# Patient Record
Sex: FEMALE | Race: WHITE | Employment: FULL TIME | URBAN - METROPOLITAN AREA
[De-identification: names, ages, dates, MRNs, and addresses within clinical notes are randomized per-mention and may not be internally consistent; named-entity substitution may affect disease eponyms.]

---

## 2022-11-29 ENCOUNTER — OFFICE VISIT (OUTPATIENT)
Dept: ORTHOPEDIC SURGERY | Age: 52
End: 2022-11-29
Payer: COMMERCIAL

## 2022-11-29 VITALS — WEIGHT: 195 LBS | BODY MASS INDEX: 31.34 KG/M2 | HEIGHT: 66 IN

## 2022-11-29 DIAGNOSIS — M79.672 LEFT FOOT PAIN: ICD-10-CM

## 2022-11-29 DIAGNOSIS — S92.512A DISPLACED FRACTURE OF PROXIMAL PHALANX OF LEFT LESSER TOE(S), INITIAL ENCOUNTER FOR CLOSED FRACTURE: Primary | ICD-10-CM

## 2022-11-29 RX ORDER — LEVOTHYROXINE SODIUM 100 UG/1
TABLET ORAL
COMMUNITY

## 2022-11-29 NOTE — LETTER
12/2/2022    Patient: Chad Greenberg   YOB: 1970   Date of Visit: 11/29/2022     Franchesca Negrete MD  [address] [place] [phone]  Via Fax: [phone]    Dear Franchesca Negrete MD,      Thank you for referring Ms. Chad Greenberg to [facility] for evaluation. My notes for this consultation are attached. If you have questions, please do not hesitate to call me. I look forward to following your patient along with you.       Sincerely,    Althea Jorge MD

## 2022-11-29 NOTE — PROGRESS NOTES
Mireya Gardner (: 1970) is a 45 y.o. female, patient,here for evaluation of the following   Chief Complaint   Patient presents with    Foot Pain        ASSESSMENT/PLAN:  Below is the assessment and plan developed based on review of pertinent history, physical exam, labs, studies, and medications. 1. Displaced fracture of proximal phalanx of left lesser toe(s), initial encounter for closed fracture  -     [place] CLOSED TX TOE FX  2. Left foot pain  -     XR STANDING FOOT LT MIN 3 V; Future      The patient has a left fifth toe proximal phalanx fracture otherwise well aligned. This fracture is at the base involving the condyle and its into the joint but overall joint alignment is good and minimally displaced. I do recommend a rigid soled shoe and angel taping to keep the toe immobilized so does not move so much as it is at the joint and the motion can create delayed union. She understands this and will wear more rigid soled shoes and angel tape as instructed. I did inform her to return in approximately 4 to 6 weeks at which time we will get new x-rays of the left fifth toe 3 views. Return in about 6 weeks (around 1/10/2023) for repeat xrays. Allergies   Allergen Reactions    Bactrim [Sulfamethoprim] Other (comments)       Current Outpatient Medications   Medication Sig    levothyroxine (SYNTHROID) 100 mcg tablet Take  by mouth Daily (before breakfast). No current facility-administered medications for this visit. History reviewed. No pertinent past medical history. History reviewed. No pertinent surgical history. History reviewed. No pertinent family history.     Social History     Socioeconomic History    Marital status:      Spouse name: Not on file    Number of children: Not on file    Years of education: Not on file    Highest education level: Not on file   Occupational History    Not on file   Tobacco Use    Smoking status: Never    Smokeless tobacco: Never   Vaping Use Vaping Use: Never used   Substance and Sexual Activity    Alcohol use: Yes    Drug use: Never    Sexual activity: Not on file   Other Topics Concern    Not on file   Social History Narrative    Not on file     Social Determinants of Health     Financial Resource Strain: Not on file   Food Insecurity: Not on file   Transportation Needs: Not on file   Physical Activity: Not on file   Stress: Not on file   Social Connections: Not on file   Intimate Partner Violence: Not on file   Housing Stability: Not on file           Vitals:  Ht 5' 6\" (1.676 m)   Wt 195 lb (88.5 kg)   BMI 31.47 kg/m²    Body mass index is 31.47 kg/m². ROS    Positive for: Musculoskeletal  Last edited by Trina Baird on 2022 10:11 AM.              SUBJECTIVE:  Florencia Miller (: 1970)   New patient presents today with complaint of left foot pain about 3 weeks ago now sudden onset related to an accident occurred on 2020 2 in the evening. She was packing to go on a trip and somehow injured her left fifth toe. She had immediate pain and swelling. Current pain is mild and it still comes and goes. She has tried rest, ice and elevation. She was evaluated at other facility on 2022 due to persistent pain and referred to orthopedics. She is not diabetic, non-smoker. OBJECTIVE EXAM:     Visit Vitals  Ht 5' 6\" (1.676 m)   Wt 195 lb (88.5 kg)   BMI 31.47 kg/m²       Appearance: Alert, well appearing and pleasant patient who is in no distress, oriented to person, place/time, and who follows commands. This patient is accompanied in the       office by her  self. Psychiatric: Affect and mood are appropriate. No dementia noted on examination  Musculoskeletal:  LOCATION: Diffuse tenderness and swelling fifth toe foot - left      Integumentary: No rashes, skin patches, wounds, or abrasions to the right or left legs       Warm and normal color. No regions of expressible drainage.       Gait: Normal Tenderness: No tenderness        Motor/Strength/Tone Exam: Normal       Sensory Exam:   Intact Normal Sensation to ankle/foot      Stability Testing: No anterolateral or varus instability of the Ankle or Subtalar Joints               No peroneal tendon instability noted      ROM: Normal ROM noted to ankle/foot      Contractures: No Achilles or Gastrocnemius Contractures      Calf tenderness: Absent for calf or gastrocnemius muscle regions       Soft, supple, non tender, non taut lower extremity compartment  Alignment:      NEUTRAL Hindfoot,         none Metatarsus Adductus Metatarsus   Wounds/Abrasions:    None present  Extremities:   No embolic phenomena to the toes          No significant edema to the foot and or toes. Lower extremities are warm and appear well perfused    DVT: No evidence of DVT seen on examination at this time     No calf swelling, no tenderness to calf muscles  Lymphatic:  No Evidence of Lymphedema  Vascular: Medial Border of Tibia Region: Edema is not present         Pulses: Dorsalis Pedis &  Posterior Tibial Pulses : Palpable yes        Varicosities Lower Limbs :  None  noted  Neuro: Negative bilateral Straight leg raise (seated position)    See Musculoskeletal section for pertinent individual extremity examination    No abnormal hand/wrist, foot/ankle, or facial/neck tremors. Lower Extremity/Ankle/Foot:  Mild antalgic gait, satisfactory weightbearing stance. Left lower extremity/ankle: Full active and passive range of motion intact, nontender, swelling, ligament stable. Normal exam.    Left foot: Fifth toe is not malaligned or deformed, there is swelling and tenderness to palpation proximal phalanx, rest of toes nontender and the fifth metatarsal is nontender. Range of motion of toes intact except for the fifth toe and limitations related to pain. Contralateral lower extremity/ankle /foot exam:  Nontender, no swelling ligaments grossly stable.   Normal weightbearing stance. Neurovascular exam grossly intact light touch sensation, cap refill brisk, dorsalis pedis pulse palpable, FHL/EHL 5/5. Imaging:    XR Results (most recent):  Results from Appointment encounter on 11/29/22    XR STANDING FOOT LT MIN 3 V    Narrative  Left Foot AP, lateral and oblique standing x-rays show left fifth toe proximal phalanx fracture, minimally displaced near the medial condyle proximal phalanx extends into the joint, otherwise joint is in satisfactory alignment and congruent. No fractures or dislocations seen. No significant signs of healing yet. An electronic signature was used to authenticate this note.   -- Dontae Carter MD

## 2023-01-03 ENCOUNTER — OFFICE VISIT (OUTPATIENT)
Dept: ORTHOPEDIC SURGERY | Age: 53
End: 2023-01-03
Payer: COMMERCIAL

## 2023-01-03 VITALS — HEIGHT: 66 IN | BODY MASS INDEX: 31.34 KG/M2 | WEIGHT: 195 LBS

## 2023-01-03 DIAGNOSIS — M79.672 LEFT FOOT PAIN: ICD-10-CM

## 2023-01-03 DIAGNOSIS — S92.512D CLOSED DISPLACED FRACTURE OF PROXIMAL PHALANX OF LESSER TOE OF LEFT FOOT WITH ROUTINE HEALING, SUBSEQUENT ENCOUNTER: Primary | ICD-10-CM

## 2023-01-03 PROCEDURE — 99212 OFFICE O/P EST SF 10 MIN: CPT | Performed by: ORTHOPAEDIC SURGERY

## 2023-01-03 NOTE — PROGRESS NOTES
Ezra Gutierrez (: 1970) is a 45 y.o. female, patient,here for evaluation of the following   Chief Complaint   Patient presents with    Foot Pain    Follow-up        ASSESSMENT/PLAN:  Below is the assessment and plan developed based on review of pertinent history, physical exam, labs, studies, and medications. 1. Closed displaced fracture of proximal phalanx of lesser toe of left foot with routine healing, subsequent encounter  2. Left foot pain  -     XR 5TH TOE LT MIN 2 V; Future      Patient is informed of findings on exam and x-rays reviewed today. Overall she is doing much better, the fracture is mostly healed although not completely healed, the overall alignment remains good. She is to continue in a postop shoe for another few days and then transition to more rigid soled shoes to wear for walking. She will schedule an appointment for approximately 6 weeks at which time we will get new x-rays of the left fifth toe 3 views. Return if symptoms worsen or fail to improve. Allergies   Allergen Reactions    Bactrim [Sulfamethoprim] Other (comments)       Current Outpatient Medications   Medication Sig    levothyroxine (SYNTHROID) 100 mcg tablet Take  by mouth Daily (before breakfast). No current facility-administered medications for this visit. No past medical history on file. No past surgical history on file. No family history on file.     Social History     Socioeconomic History    Marital status:      Spouse name: Not on file    Number of children: Not on file    Years of education: Not on file    Highest education level: Not on file   Occupational History    Not on file   Tobacco Use    Smoking status: Never    Smokeless tobacco: Never   Vaping Use    Vaping Use: Never used   Substance and Sexual Activity    Alcohol use: Yes    Drug use: Never    Sexual activity: Not on file   Other Topics Concern    Not on file   Social History Narrative    Not on file     Social Determinants of Health     Financial Resource Strain: Not on file   Food Insecurity: Not on file   Transportation Needs: Not on file   Physical Activity: Not on file   Stress: Not on file   Social Connections: Not on file   Intimate Partner Violence: Not on file   Housing Stability: Not on file           Vitals:  Ht 5' 6\" (1.676 m)   Wt 195 lb (88.5 kg)   BMI 31.47 kg/m²    Body mass index is 31.47 kg/m². SUBJECTIVE:  Rambo Wolf (: 1970)   Patient returns today for follow-up of left foot fifth toe proximal phalanx fracture towards the base, injury from 2022. Overall she is doing okay she has no complaints today. She has mild discomfort with increased activities is the most symptoms she is currently having. OBJECTIVE EXAM:     Visit Vitals  Ht 5' 6\" (1.676 m)   Wt 195 lb (88.5 kg)   BMI 31.47 kg/m²       Appearance: Alert, well appearing and pleasant patient who is in no distress, oriented to person, place/time, and who follows commands. This patient is accompanied in the       office by her  self. Psychiatric: Affect and mood are appropriate. No dementia noted on examination  Musculoskeletal:  LOCATION: Minimal tenderness or swelling fifth toe foot - left      Integumentary: No rashes, skin patches, wounds, or abrasions to the right or left legs       Warm and normal color. No regions of expressible drainage.       Gait: Normal      Tenderness: No tenderness        Motor/Strength/Tone Exam: Normal       Sensory Exam:   Intact Normal Sensation to ankle/foot      Stability Testing: No anterolateral or varus instability of the Ankle or Subtalar Joints               No peroneal tendon instability noted      ROM: Normal ROM noted to ankle/foot      Contractures: No Achilles or Gastrocnemius Contractures      Calf tenderness: Absent for calf or gastrocnemius muscle regions       Soft, supple, non tender, non taut lower extremity compartment  Alignment:      NEUTRAL Hindfoot,         none Metatarsus Adductus Metatarsus   Wounds/Abrasions:    None present  Extremities:   No embolic phenomena to the toes          No significant edema to the foot and or toes. Lower extremities are warm and appear well perfused    DVT: No evidence of DVT seen on examination at this time     No calf swelling, no tenderness to calf muscles  Lymphatic:  No Evidence of Lymphedema  Vascular: Medial Border of Tibia Region: Edema is not present         Pulses: Dorsalis Pedis &  Posterior Tibial Pulses : Palpable yes        Varicosities Lower Limbs :  None  noted  Neuro: Negative bilateral Straight leg raise (seated position)    See Musculoskeletal section for pertinent individual extremity examination    No abnormal hand/wrist, foot/ankle, or facial/neck tremors. Lower Extremity/Ankle/Foot:  Mostly normal gait, normal weightbearing stance. Left lower extremity/ankle: Nontender, full active and passive range of motion intact at the ankle without swelling or tenderness to palpation. Tendons and ligaments around the ankle are all intact and nontender. Left foot: There is no significant malalignment or deformity to the fifth toe, there is still little bit of tenderness with deep palpation proximal part of fifth toe at proximal phalanx. Minimal swelling. No other areas of tenderness to palpation. Contralateral lower extremity/ankle /foot exam:  Nontender, no swelling ligaments grossly stable. Normal weightbearing stance. Neurovascular exam grossly intact. Imaging:    XR Results (most recent):  Results from Appointment encounter on 01/03/23    XR 5TH TOE LT MIN 2 V    Narrative  Left Fifth toe AP, lateral and oblique x-rays shows the proximal phalanx fracture at the base is healing, still 900% healed however but overall alignment remains good and the joint is mostly congruent. No other fractures or dislocations seen.           An electronic signature was used to authenticate this note.  -- Kayley Vela MD

## 2023-01-03 NOTE — LETTER
1/3/2023    Patient: Kellen Hightower   YOB: 1970   Date of Visit: 1/3/2023     Monserrat Long MD  [address] [place] [phone]  Via Fax: [phone]    Dear Monserrat Long MD,      Thank you for referring Ms. Kellen Hightower to [facility] for evaluation. My notes for this consultation are attached. If you have questions, please do not hesitate to call me. I look forward to following your patient along with you.       Sincerely,    Ghazala Valenzuela MD